# Patient Record
Sex: FEMALE | Race: OTHER | NOT HISPANIC OR LATINO | URBAN - METROPOLITAN AREA
[De-identification: names, ages, dates, MRNs, and addresses within clinical notes are randomized per-mention and may not be internally consistent; named-entity substitution may affect disease eponyms.]

---

## 2022-03-26 ENCOUNTER — EMERGENCY (EMERGENCY)
Facility: HOSPITAL | Age: 27
LOS: 1 days | Discharge: HOME | End: 2022-03-26
Admitting: EMERGENCY MEDICINE
Payer: COMMERCIAL

## 2022-03-26 VITALS
WEIGHT: 110.23 LBS | TEMPERATURE: 98 F | HEIGHT: 63.39 IN | SYSTOLIC BLOOD PRESSURE: 100 MMHG | HEART RATE: 69 BPM | RESPIRATION RATE: 18 BRPM | DIASTOLIC BLOOD PRESSURE: 70 MMHG | OXYGEN SATURATION: 97 %

## 2022-03-26 DIAGNOSIS — R22.0 LOCALIZED SWELLING, MASS AND LUMP, HEAD: ICD-10-CM

## 2022-03-26 DIAGNOSIS — K08.89 OTHER SPECIFIED DISORDERS OF TEETH AND SUPPORTING STRUCTURES: ICD-10-CM

## 2022-03-26 PROCEDURE — 99283 EMERGENCY DEPT VISIT LOW MDM: CPT

## 2022-03-26 RX ORDER — TRAMADOL HYDROCHLORIDE 50 MG/1
50 TABLET ORAL ONCE
Refills: 0 | Status: DISCONTINUED | OUTPATIENT
Start: 2022-03-26 | End: 2022-03-26

## 2022-03-26 RX ORDER — TRAMADOL HYDROCHLORIDE 50 MG/1
1 TABLET ORAL
Qty: 12 | Refills: 0
Start: 2022-03-26

## 2022-03-26 RX ADMIN — Medication 1 TABLET(S): at 09:29

## 2022-03-26 RX ADMIN — TRAMADOL HYDROCHLORIDE 50 MILLIGRAM(S): 50 TABLET ORAL at 09:29

## 2022-03-26 NOTE — ED PROVIDER NOTE - CLINICAL SUMMARY MEDICAL DECISION MAKING FREE TEXT BOX
27 y/o f presents with toothache for 4 days; pt afebrile, has what appears to be an impacted wisdom tooth on exam, no concern for abscess although possible to have early dental infection so will cover with augmentin, given pain medication, will d/c, plan to f/u with dentist as scheduled, return to ED if sx worsen.

## 2022-03-26 NOTE — ED PROVIDER NOTE - ENMT, MLM
Airway patent, no facial swelling or asymmetry.  +impacted wisdom tooth to right posterior mouth, no gingival fluctuance or swelling, no pus

## 2022-03-26 NOTE — ED ADULT NURSE NOTE - OBJECTIVE STATEMENT
26years female alert mental state (AOX3) received on foot.  -Allergy: N/A.  -complain of toothache 5 days ago.  pt has pain on Rt lower wisdom toothache 5days ago. pt got dentist appointment next Monday. pt started to accompany with headache, mild fever, Rt neck pain while swelling something since yesterday. pt states " I think I need antibiotics".  -denied tenderness of neck, SOB, chocking sign, chest pain, n/v/d, myalgia.  Pt is in the chair comfortably at this time. Will continue to monitor and document any changes.

## 2022-03-26 NOTE — ED PROVIDER NOTE - OBJECTIVE STATEMENT
25 y/o f no pmh presents c/o toothache for the past 4 days.  Pt reports pain to right bottom back tooth, thinks her face is slightly swollen on right side today.  Pt has been taking ibuprofen and tylenol with some improvement, made a dental appointment for 2 days from now but came with concern for possible infection.  Denies fever, chills, difficulty breathing/swallowing, all other ROS negative.

## 2022-03-26 NOTE — ED PROVIDER NOTE - NSFOLLOWUPINSTRUCTIONS_ED_ALL_ED_FT
Dental Pain       Dental pain is often a sign that something is wrong with your teeth or gums. You can also have pain after a dental treatment. If you have dental pain, it is important to contact your dentist, especially if the cause of the pain is not known. Dental pain may hurt a lot or a little and can be caused by many things, including:  •Tooth decay (cavities or caries).      •Infection.      •The inner part of the tooth being filled with pus (an abscess).      •Injury.      •A crack in the tooth.      •Gums that move back and expose the root of a tooth.      •Gum disease.      •Abnormal grinding or clenching of teeth.      •Not taking good care of your teeth.      Sometimes the cause of pain is not known.    You may have pain all the time, or it may happen only when you are:  •Chewing.      •Exposed to hot or cold temperatures.      •Eating or drinking foods or drinks that have a lot of sugar in them, such as soda or candy.        Follow these instructions at home:    Medicines     •Take over-the-counter and prescription medicines only as told by your dentist.      •If you were prescribed an antibiotic medicine, take it as told by your dentist. Do not stop taking it even if you start to feel better.      Eating and drinking     Do not eat foods or drinks that cause you pain. These include:  •Very hot or very cold foods or drinks.      •Sweet or sugary foods or drinks.        Managing pain and swelling  •If told, put ice on the painful area of your face. To do this:  •Put ice in a plastic bag.       •Place a towel between your skin and the bag.       •Leave the ice on for 20 minutes, 2–3 times a day.      •Take off the ice if your skin turns bright red. This is very important. If you cannot feel pain, heat, or cold, you have a greater risk of damage to the area.        Brushing your teeth    •Brush your teeth twice a day using a fluoride toothpaste.       •Use a toothpaste made for sensitive teeth as told by your dentist.      •Use a soft toothbrush.      General instructions    •Floss your teeth at least once a day.      •Do not put heat on the outside of your face.      •Rinse your mouth often with salt water. To make salt water, dissolve ½–1 tsp (3–6 g) of salt in 1 cup (237 mL) of warm water.      •Watch your dental pain. Let your dentist know if there are any changes.      •Keep all follow-up visits.        Contact a dentist if:    •You have dental pain and you do not know why.      •Medicine does not help your pain.      •Your symptoms get worse.      •You have new symptoms.        Get help right away if:    •You cannot open your mouth.      •You are having trouble breathing or swallowing.      •You have a fever.      •Your face, neck, or jaw is swollen.      These symptoms may be an emergency. Get help right away. Call your local emergency services (911 in the [place]   • Do not wait to see if the symptoms will go away.       • Do not drive yourself to the hospital.         Summary    •Dental pain may be caused by many things, including tooth decay, injury, or infection. In some cases, the cause is not known.      •Dental pain may hurt a lot or very little. You may have pain all the time, or you may have it only when you eat or drink.      •Take over-the-counter and prescription medicines only as told by your dentist.      •Watch your dental pain for any changes. Let your dentist know if symptoms get worse.      This information is not intended to replace advice given to you by your health care provider. Make sure you discuss any questions you have with your health care provider.

## 2022-03-26 NOTE — ED PROVIDER NOTE - PATIENT PORTAL LINK FT
You can access the FollowMyHealth Patient Portal offered by [facility] by registering at the following website: [email or web address]. By joining [employer]’s FollowMyHealth portal, you will also be able to view your health information using other applications (apps) compatible with our system.

## 2022-03-26 NOTE — ED ADULT NURSE NOTE - CAS DISCH CONDITION

## 2022-03-26 NOTE — ED PROVIDER NOTE - TIMING
gradual onset Alar Island Pedicle Flap Text: The defect edges were debeveled with a #15 scalpel blade.  Given the location of the defect, shape of the defect and the proximity to the alar rim an island pedicle advancement flap was deemed most appropriate.  Using a sterile surgical marker, an appropriate advancement flap was drawn incorporating the defect, outlining the appropriate donor tissue and placing the expected incisions within the nasal ala running parallel to the alar rim. The area thus outlined was incised with a #15 scalpel blade.  The skin margins were undermined minimally to an appropriate distance in all directions around the primary defect and laterally outward around the island pedicle utilizing iris scissors.  There was minimal undermining beneath the pedicle flap.
